# Patient Record
Sex: FEMALE | URBAN - METROPOLITAN AREA
[De-identification: names, ages, dates, MRNs, and addresses within clinical notes are randomized per-mention and may not be internally consistent; named-entity substitution may affect disease eponyms.]

---

## 2020-07-06 ENCOUNTER — OUT OF OFFICE VISIT (OUTPATIENT)
Dept: URBAN - METROPOLITAN AREA MEDICAL CENTER 1 | Facility: MEDICAL CENTER | Age: 40
End: 2020-07-06
Payer: SELF-PAY

## 2020-07-06 DIAGNOSIS — R10.13 ABDOMINAL DISCOMFORT, EPIGASTRIC: ICD-10-CM

## 2020-07-06 DIAGNOSIS — E87.6 HYPOKALEMIA: ICD-10-CM

## 2020-07-06 DIAGNOSIS — R11.2 ACUTE NAUSEA WITH NONBILIOUS VOMITING: ICD-10-CM

## 2020-07-06 PROCEDURE — 99254 IP/OBS CNSLTJ NEW/EST MOD 60: CPT | Performed by: INTERNAL MEDICINE

## 2020-07-07 ENCOUNTER — OUT OF OFFICE VISIT (OUTPATIENT)
Dept: URBAN - METROPOLITAN AREA MEDICAL CENTER 1 | Facility: MEDICAL CENTER | Age: 40
End: 2020-07-07
Payer: SELF-PAY

## 2020-07-07 DIAGNOSIS — R11.2 ACUTE NAUSEA WITH NONBILIOUS VOMITING: ICD-10-CM

## 2020-07-07 DIAGNOSIS — Z86.59 HISTORY OF BULIMIA: ICD-10-CM

## 2020-07-07 DIAGNOSIS — E87.8 ELECTROLYTE ABNORMALITY: ICD-10-CM

## 2020-07-07 PROCEDURE — 99232 SBSQ HOSP IP/OBS MODERATE 35: CPT | Performed by: INTERNAL MEDICINE

## 2020-07-08 ENCOUNTER — OUT OF OFFICE VISIT (OUTPATIENT)
Dept: URBAN - METROPOLITAN AREA MEDICAL CENTER 1 | Facility: MEDICAL CENTER | Age: 40
End: 2020-07-08
Payer: SELF-PAY

## 2020-07-08 DIAGNOSIS — K92.2 ACUTE GASTROINTESTINAL BLEEDING: ICD-10-CM

## 2020-07-08 DIAGNOSIS — K29.60 ADENOPAPILLOMATOSIS GASTRICA: ICD-10-CM

## 2020-07-08 DIAGNOSIS — R11.0 CHRONIC NAUSEA: ICD-10-CM

## 2020-07-08 DIAGNOSIS — K29.80 ACUTE DUODENITIS: ICD-10-CM

## 2020-07-08 PROCEDURE — 43239 EGD BIOPSY SINGLE/MULTIPLE: CPT | Performed by: INTERNAL MEDICINE

## 2020-08-18 ENCOUNTER — OFFICE VISIT (OUTPATIENT)
Dept: URBAN - METROPOLITAN AREA TELEHEALTH 2 | Facility: TELEHEALTH | Age: 40
End: 2020-08-18

## 2020-08-19 ENCOUNTER — OFFICE VISIT (OUTPATIENT)
Dept: URBAN - NONMETROPOLITAN AREA CLINIC 13 | Facility: CLINIC | Age: 40
End: 2020-08-19

## 2020-08-19 NOTE — HPI-TODAY'S VISIT:
39 year old with history of anorexia nervosa/bulimia, anxiety, chronic pain on chronic opioid medication, prolonged QTc, who was hospitalized at [facility] in 2020 for nausea/vomiting/diarrhea, who presents for follow-up .  Note: had laparoscopic cholecystectomy in 2020 for concern for acalculous cholecystitis.   Possible related to surreptitious use of laxatives. Symptoms resolved during her hospitalization prior to undergoing stool studies. Cortisol normal in the past.   2020: Fecal occult positive.   Underwnet EGD: gastric ulcers. Biopsied.   Seen 2020 for tachycardia.  Pathology from cholecystectomy revealed mild chronic cholecystitis. No evidence of carcinoma.   3/2020: Giardia/cryptosporidium negative, shiga-like toxin negative, 	 3/2020: Stool culture: No Salmonella, Shigella, Campylobacter, Vibrio, Aeromonas, Plesiomonas, Yersinia   Prior Endoscopic Evaluation: 2020: EGD FINDINGS / IMPRESSION: The entire esophagus appeared normal.  The antrum contained several shallow clean base erosions and superimposed bed of inflammation consistent with erosive gastritis.  Biopsies were obtained from the antrum and body with cold forceps and sent for pathology to evaluate for H. Pylori.  The duodenal bulb and second portion of the duodenum appeared normal.  Random biopsies were obtained from the second portion of the duodenum with cold forceps and sent for pathology.  Erosive gastritis may be contributing to clinical symptoms.    2020: Pathology from EGD A.   DUODENUM, BIOPSY:              MINIMAL NONSPECIFIC DUODENAL VILLOUS ARCHITECTURAL BLUNTING.              MILD CHRONIC NONSPECIFIC INFLAMMATION.                NO CARCINOMA SEEN. B.  STOMACH, BIOPSY:              MILD TO MODERATE CHRONIC GASTRITIS.              IMMUNOPEROXIDASE  STAIN IS NEGATIVE FOR HELICOBACTER.              NO CARCINOMA SEEN.    Prior Imagin2019: CT Abdomen and Pelvis with Contrast IMPRESSION: 1. No significant change comparison to prior study performed one day prior. Persistent thickening of multiple small bowel loops as well as a large amount stool in the rectosigmoid colon. No pneumatosis or free air. 2. Mild free fluid in the abdomen and pelvis. No drainable abscess.  2020: Abdominal Ultrasound IMPRESSION: 1. No shadowing echogenic gallstones are identified. However, the gallbladder demonstrates circumferential wall thickening/edema, there is trace pericholecystic fluid, and sonographer reports positive sonographic Abrams's sign. These findings are concerning for acalculous acute cholecystitis.   2. Mildly increased right renal cortical echogenicity, suggestive of medical renal disease.  Plan: -CBC, chemistry, TSH, Celiac serologies. consider EGD/colonoscop with random colon biosies. - Think about cyclic vomiting syndrome.  - Think about gastric emptying - Think about stopping opioid pain medication. - Stool studies  - Bowel regimen - CT-Etnerography given thcikened small bowel mucosa.  - Repeat CBC, chemistry, TSH (as was >21 in the past). Repeat C difficile as PCR positive but toxin negative in 2020.

## 2020-08-24 ENCOUNTER — OFFICE VISIT (OUTPATIENT)
Dept: URBAN - NONMETROPOLITAN AREA CLINIC 13 | Facility: CLINIC | Age: 40
End: 2020-08-24
Payer: SELF-PAY

## 2020-08-24 DIAGNOSIS — R93.3 ABNORMAL CT SCAN, COLON: ICD-10-CM

## 2020-08-24 DIAGNOSIS — R11.0 NAUSEA: ICD-10-CM

## 2020-08-24 DIAGNOSIS — K29.70 GASTRITIS, PRESENCE OF BLEEDING UNSPECIFIED, UNSPECIFIED CHRONICITY, UNSPECIFIED GASTRITIS TYPE: ICD-10-CM

## 2020-08-24 DIAGNOSIS — N17.9 ACUTE KIDNEY INJURY: ICD-10-CM

## 2020-08-24 PROCEDURE — 99204 OFFICE O/P NEW MOD 45 MIN: CPT | Performed by: INTERNAL MEDICINE

## 2020-08-24 PROCEDURE — G8420 CALC BMI NORM PARAMETERS: HCPCS | Performed by: INTERNAL MEDICINE

## 2020-08-24 PROCEDURE — G8427 DOCREV CUR MEDS BY ELIG CLIN: HCPCS | Performed by: INTERNAL MEDICINE

## 2020-08-24 RX ORDER — PROCHLORPERAZINE MALEATE 10 MG/1
1 TABLET AS NEEDED TABLET ORAL THREE TIMES A DAY
Qty: 270 TABLET | Refills: 3 | OUTPATIENT
Start: 2020-08-24

## 2020-08-24 RX ORDER — ALPRAZOLAM 1 MG/1
1 TABLET TABLET ORAL TWICE A DAY
Status: ACTIVE | COMMUNITY

## 2020-08-24 RX ORDER — ZOLPIDEM TARTRATE 5 MG/1
1 TABLET AT BEDTIME TABLET, FILM COATED ORAL ONCE A DAY
Status: ACTIVE | COMMUNITY

## 2020-08-24 RX ORDER — SUCRALFATE 1 G/1
1 TABLET ON AN EMPTY STOMACH TABLET ORAL TWICE A DAY
Status: ACTIVE | COMMUNITY

## 2020-08-24 RX ORDER — ACETAMINOPHEN 650 MG/20.3ML
AS DIRECTED SOLUTION ORAL
Status: ACTIVE | COMMUNITY

## 2020-08-24 RX ORDER — LEVOTHYROXINE SODIUM 100 UG/1
1 TABLET IN THE MORNING ON AN EMPTY STOMACH TABLET ORAL ONCE A DAY
Status: ACTIVE | COMMUNITY

## 2020-08-24 RX ORDER — MULTIVITAMIN/IRON/FOLIC ACID 18MG-0.4MG
1 TABLET AT BEDTIME AS NEEDED TABLET ORAL ONCE A DAY
Status: ACTIVE | COMMUNITY

## 2020-08-24 RX ORDER — TIZANIDINE 4 MG/1
1 TABLET AS NEEDED TABLET ORAL THREE TIMES A DAY
Status: ACTIVE | COMMUNITY

## 2020-08-24 RX ORDER — OMEGA-3/DHA/EPA/FISH OIL 300-1000MG
AS DIRECTED CAPSULE ORAL
Status: ACTIVE | COMMUNITY

## 2020-08-24 RX ORDER — OXYCODONE AND ACETAMINOPHEN 5; 325 MG/1; MG/1
1 TABLET AS NEEDED TABLET ORAL
Status: ACTIVE | COMMUNITY

## 2020-08-24 RX ORDER — PANTOPRAZOLE SODIUM 40 MG/1
1 TABLET TABLET, DELAYED RELEASE ORAL ONCE A DAY
Status: ACTIVE | COMMUNITY

## 2020-08-24 RX ORDER — FOLIC ACID/MULTIVIT,IRON,MINER .4-18-35
AS DIRECTED TABLET,CHEWABLE ORAL
Status: ACTIVE | COMMUNITY

## 2020-08-24 RX ORDER — POTASSIUM CHLORIDE 1.5 G/1.58G
1 PACKET WITH FOOD POWDER, FOR SOLUTION ORAL ONCE A DAY
Status: ACTIVE | COMMUNITY

## 2020-08-24 RX ORDER — DULOXETINE 60 MG/1
1 CAPSULE CAPSULE, DELAYED RELEASE PELLETS ORAL ONCE A DAY
Status: ACTIVE | COMMUNITY

## 2020-08-24 NOTE — HPI-TODAY'S VISIT:
Ms. Goetz is a pleasant 40 y/o F referred to GI clinic for evaluation and management of chronic nausea.  This has been severe for months and is worsening.  She was hospitalized for volume depletion recently and just got discharged from [facility].  Workup to date includes EGD with erosions, H. pylori negative.  She is s/p CCY as she had GB wall thickening.  She was found to have ? colitis on a recent CT scan, however this was noncontrasted.  CMP including albumin normal.  CBC showed normocytic anemia.  She is still nauseous.  She has been taking compazine but is out now.  She was taking Zofran but they stopped it as it as she was having long QT on it.  Her hospitalizations, imaging, and procedures were evaluated.

## 2020-08-27 ENCOUNTER — TELEPHONE ENCOUNTER (OUTPATIENT)
Dept: URBAN - METROPOLITAN AREA CLINIC 92 | Facility: CLINIC | Age: 40
End: 2020-08-27

## 2020-09-03 ENCOUNTER — OUT OF OFFICE VISIT (OUTPATIENT)
Dept: URBAN - METROPOLITAN AREA MEDICAL CENTER 1 | Facility: MEDICAL CENTER | Age: 40
End: 2020-09-03
Payer: SELF-PAY

## 2020-09-03 DIAGNOSIS — R11.2 ACUTE NAUSEA WITH NONBILIOUS VOMITING: ICD-10-CM

## 2020-09-03 DIAGNOSIS — E87.8 ELECTROLYTE ABNORMALITY: ICD-10-CM

## 2020-09-03 PROCEDURE — 99254 IP/OBS CNSLTJ NEW/EST MOD 60: CPT | Performed by: INTERNAL MEDICINE

## 2020-10-06 ENCOUNTER — OUT OF OFFICE VISIT (OUTPATIENT)
Dept: URBAN - METROPOLITAN AREA MEDICAL CENTER 1 | Facility: MEDICAL CENTER | Age: 40
End: 2020-10-06
Payer: SELF-PAY

## 2020-10-06 DIAGNOSIS — R93.3 ABN FINDINGS-GI TRACT: ICD-10-CM

## 2020-10-06 DIAGNOSIS — E87.6 HYPOKALEMIA: ICD-10-CM

## 2020-10-06 DIAGNOSIS — F50.00 ANOREXIA NERVOSA: ICD-10-CM

## 2020-10-06 DIAGNOSIS — R11.2 ACUTE NAUSEA WITH NONBILIOUS VOMITING: ICD-10-CM

## 2020-10-06 PROCEDURE — 99232 SBSQ HOSP IP/OBS MODERATE 35: CPT | Performed by: INTERNAL MEDICINE

## 2020-10-06 PROCEDURE — 99254 IP/OBS CNSLTJ NEW/EST MOD 60: CPT | Performed by: INTERNAL MEDICINE

## 2020-10-21 ENCOUNTER — OFFICE VISIT (OUTPATIENT)
Dept: URBAN - METROPOLITAN AREA MEDICAL CENTER 1 | Facility: MEDICAL CENTER | Age: 40
End: 2020-10-21

## 2021-11-02 ENCOUNTER — TELEPHONE ENCOUNTER (OUTPATIENT)
Dept: URBAN - NONMETROPOLITAN AREA CLINIC 2 | Facility: CLINIC | Age: 41
End: 2021-11-02

## 2021-11-02 RX ORDER — PROCHLORPERAZINE MALEATE 10 MG/1
1 TABLET AS NEEDED TABLET ORAL THREE TIMES A DAY
Qty: 90 | Refills: 0
Start: 2020-08-24

## 2021-11-08 ENCOUNTER — OFFICE VISIT (OUTPATIENT)
Dept: URBAN - NONMETROPOLITAN AREA CLINIC 13 | Facility: CLINIC | Age: 41
End: 2021-11-08
Payer: SELF-PAY

## 2021-11-08 VITALS
DIASTOLIC BLOOD PRESSURE: 64 MMHG | HEIGHT: 64 IN | WEIGHT: 91.6 LBS | BODY MASS INDEX: 15.64 KG/M2 | HEART RATE: 142 BPM | SYSTOLIC BLOOD PRESSURE: 86 MMHG

## 2021-11-08 DIAGNOSIS — R11.0 NAUSEA: ICD-10-CM

## 2021-11-08 DIAGNOSIS — R63.4 WEIGHT LOSS: ICD-10-CM

## 2021-11-08 DIAGNOSIS — A04.72 C. DIFFICILE COLITIS: ICD-10-CM

## 2021-11-08 PROCEDURE — 99214 OFFICE O/P EST MOD 30 MIN: CPT | Performed by: NURSE PRACTITIONER

## 2021-11-08 RX ORDER — PANTOPRAZOLE SODIUM 40 MG/1
1 TABLET TABLET, DELAYED RELEASE ORAL ONCE A DAY
Status: ACTIVE | COMMUNITY

## 2021-11-08 RX ORDER — DULOXETINE 60 MG/1
1 CAPSULE CAPSULE, DELAYED RELEASE PELLETS ORAL ONCE A DAY
Status: ACTIVE | COMMUNITY

## 2021-11-08 RX ORDER — ALPRAZOLAM 1 MG/1
1 TABLET TABLET ORAL TWICE A DAY
Status: ACTIVE | COMMUNITY

## 2021-11-08 RX ORDER — FOLIC ACID/MULTIVIT,IRON,MINER .4-18-35
AS DIRECTED TABLET,CHEWABLE ORAL
Status: ACTIVE | COMMUNITY

## 2021-11-08 RX ORDER — PROCHLORPERAZINE MALEATE 10 MG/1
1 TABLET AS NEEDED TABLET ORAL THREE TIMES A DAY
Qty: 90 | Refills: 0 | Status: ACTIVE | COMMUNITY
Start: 2020-08-24

## 2021-11-08 RX ORDER — ZOLPIDEM TARTRATE 5 MG/1
1 TABLET AT BEDTIME TABLET, FILM COATED ORAL ONCE A DAY
Status: ACTIVE | COMMUNITY

## 2021-11-08 RX ORDER — PANTOPRAZOLE SODIUM 40 MG/1
1 TABLET TABLET, DELAYED RELEASE ORAL TWICE DAILY
Qty: 60 TABLET | Refills: 6 | OUTPATIENT
Start: 2021-11-08

## 2021-11-08 RX ORDER — PROMETHAZINE HYDROCHLORIDE 12.5 MG/1
1 TABLET AS NEEDED TABLET ORAL
Qty: 30 TABLET | OUTPATIENT
Start: 2021-11-08 | End: 2021-12-08

## 2021-11-08 RX ORDER — ACETAMINOPHEN 650 MG/20.3ML
AS DIRECTED SOLUTION ORAL
Status: ACTIVE | COMMUNITY

## 2021-11-08 RX ORDER — TIZANIDINE 4 MG/1
1 TABLET AS NEEDED TABLET ORAL THREE TIMES A DAY
Status: ACTIVE | COMMUNITY

## 2021-11-08 RX ORDER — POTASSIUM CHLORIDE 1.5 G/1.58G
1 PACKET WITH FOOD POWDER, FOR SOLUTION ORAL ONCE A DAY
Status: ACTIVE | COMMUNITY

## 2021-11-08 RX ORDER — DICYCLOMINE HYDROCHLORIDE 10 MG/1
1 CAPSULES CAPSULE ORAL THREE TIMES A DAY
Qty: 90 CAPSULE | Refills: 3 | OUTPATIENT
Start: 2021-11-08 | End: 2022-03-08

## 2021-11-08 RX ORDER — OMEGA-3/DHA/EPA/FISH OIL 300-1000MG
AS DIRECTED CAPSULE ORAL
Status: ACTIVE | COMMUNITY

## 2021-11-08 RX ORDER — SUCRALFATE 1 G/1
1 TABLET ON AN EMPTY STOMACH TABLET ORAL TWICE A DAY
Status: ACTIVE | COMMUNITY

## 2021-11-08 RX ORDER — MULTIVITAMIN/IRON/FOLIC ACID 18MG-0.4MG
1 TABLET AT BEDTIME AS NEEDED TABLET ORAL ONCE A DAY
Status: ACTIVE | COMMUNITY

## 2021-11-08 RX ORDER — OXYCODONE AND ACETAMINOPHEN 5; 325 MG/1; MG/1
1 TABLET AS NEEDED TABLET ORAL
Status: ACTIVE | COMMUNITY

## 2021-11-08 RX ORDER — LEVOTHYROXINE SODIUM 100 UG/1
1 TABLET IN THE MORNING ON AN EMPTY STOMACH TABLET ORAL ONCE A DAY
Status: ACTIVE | COMMUNITY

## 2021-11-08 NOTE — HPI-TODAY'S VISIT:
Consult: Ms. Goetz is a pleasant 40 y/o F referred to GI clinic for evaluation and management of chronic nausea.  This has been severe for months and is worsening.  She was hospitalized for volume depletion recently and just got discharged from [facility].  Workup to date includes EGD with erosions, H. pylori negative.  She is s/p CCY as she had GB wall thickening.  She was found to have ? colitis on a recent CT scan, however this was noncontrasted.  CMP including albumin normal.  CBC showed normocytic anemia.  She is still nauseous.  She has been taking compazine but is out now.  She was taking Zofran but they stopped it as it as she was having long QT on it.  Her hospitalizations, imaging, and procedures were evaluated.  11/8/2021 Ira is a 40-year-old female who returns for hospital follow-up of C. difficile, colitis, weight loss, as well as nausea and vomiting.  She was seen last year with nausea and vomiting.  She had an EGD with some erosions.  Ended up with a cholecystectomy.  She is recommended a colonoscopy for colitis that was shown then.  Patient is uninsured and did not follow-up.  Over the last year, she was still getting spells of nausea and vomiting, but it seems to be related to her cycles.  She did see a GYN with no improvement.  She had numerous hospitalizations for electrolyte imbalances.  Here recently, in October, she developed diarrhea nausea and vomiting.  She was diagnosed with C. difficile with pancolitis on imaging.  Since then, she has not been doing well.  She is also been readmitted once with hypotension requiring ICU stay.  She states she has lost about 25 to 30 pounds.  She states Compazine is typically the only thing that helps her nausea. Sb

## 2021-11-15 ENCOUNTER — OUT OF OFFICE VISIT (OUTPATIENT)
Dept: URBAN - METROPOLITAN AREA MEDICAL CENTER 1 | Facility: MEDICAL CENTER | Age: 41
End: 2021-11-15
Payer: SELF-PAY

## 2021-11-15 DIAGNOSIS — A41.89 OTHER SPECIFIED SEPSIS: ICD-10-CM

## 2021-11-15 DIAGNOSIS — D50.9 ANEMIA: ICD-10-CM

## 2021-11-15 DIAGNOSIS — R11.2 ACUTE NAUSEA WITH NONBILIOUS VOMITING: ICD-10-CM

## 2021-11-15 DIAGNOSIS — R19.7 ACUTE DIARRHEA: ICD-10-CM

## 2021-11-15 PROCEDURE — 99254 IP/OBS CNSLTJ NEW/EST MOD 60: CPT | Performed by: INTERNAL MEDICINE

## 2021-11-16 ENCOUNTER — OUT OF OFFICE VISIT (OUTPATIENT)
Dept: URBAN - METROPOLITAN AREA MEDICAL CENTER 1 | Facility: MEDICAL CENTER | Age: 41
End: 2021-11-16
Payer: SELF-PAY

## 2021-11-16 DIAGNOSIS — K29.60 ADENOPAPILLOMATOSIS GASTRICA: ICD-10-CM

## 2021-11-16 DIAGNOSIS — B37.81 CANDIDA: ICD-10-CM

## 2021-11-16 DIAGNOSIS — K29.80 ACUTE DUODENITIS: ICD-10-CM

## 2021-11-16 PROCEDURE — 43239 EGD BIOPSY SINGLE/MULTIPLE: CPT | Performed by: INTERNAL MEDICINE

## 2021-11-17 ENCOUNTER — OUT OF OFFICE VISIT (OUTPATIENT)
Dept: URBAN - METROPOLITAN AREA MEDICAL CENTER 1 | Facility: MEDICAL CENTER | Age: 41
End: 2021-11-17
Payer: SELF-PAY

## 2021-11-17 DIAGNOSIS — D50.9 ANEMIA: ICD-10-CM

## 2021-11-17 DIAGNOSIS — A41.89 OTHER SPECIFIED SEPSIS: ICD-10-CM

## 2021-11-17 DIAGNOSIS — R19.7 ACUTE DIARRHEA: ICD-10-CM

## 2021-11-17 DIAGNOSIS — R11.2 ACUTE NAUSEA WITH NONBILIOUS VOMITING: ICD-10-CM

## 2021-11-17 PROCEDURE — 99232 SBSQ HOSP IP/OBS MODERATE 35: CPT | Performed by: INTERNAL MEDICINE

## 2021-11-19 NOTE — PHYSICAL EXAM HENT:
Head,  normocephalic,  atraumatic,  Face,  Face within normal limits,  Ears,  External ears within normal limits,  Nose/Nasopharynx,  External nose  normal appearance,  nares patent,  no nasal discharge,  Mouth and Throat,  Oral cavity appearance normal,  Breath odor normal,  Lips,  Appearance normal
Pt presents to ED with c/o pain and swelling in bilateral legs for 2x weeks.

## 2021-11-24 ENCOUNTER — OUT OF OFFICE VISIT (OUTPATIENT)
Dept: URBAN - METROPOLITAN AREA MEDICAL CENTER 1 | Facility: MEDICAL CENTER | Age: 41
End: 2021-11-24
Payer: SELF-PAY

## 2021-11-24 DIAGNOSIS — B37.81 CANDIDA: ICD-10-CM

## 2021-11-24 DIAGNOSIS — R19.7 ACUTE DIARRHEA: ICD-10-CM

## 2021-11-24 DIAGNOSIS — N17.8 OTHER ACUTE KIDNEY FAILURE: ICD-10-CM

## 2021-11-24 DIAGNOSIS — R13.19 CERVICAL DYSPHAGIA: ICD-10-CM

## 2021-11-24 PROCEDURE — 99254 IP/OBS CNSLTJ NEW/EST MOD 60: CPT | Performed by: INTERNAL MEDICINE

## 2021-12-21 ENCOUNTER — TELEPHONE ENCOUNTER (OUTPATIENT)
Dept: URBAN - NONMETROPOLITAN AREA CLINIC 13 | Facility: CLINIC | Age: 41
End: 2021-12-21

## 2021-12-21 RX ORDER — PROCHLORPERAZINE MALEATE 10 MG/1
1 TABLET AS NEEDED TABLET ORAL THREE TIMES A DAY
Qty: 90 | Refills: 0
Start: 2020-08-24

## 2021-12-28 ENCOUNTER — ERX REFILL RESPONSE (OUTPATIENT)
Dept: URBAN - NONMETROPOLITAN AREA CLINIC 2 | Facility: CLINIC | Age: 41
End: 2021-12-28

## 2021-12-28 RX ORDER — PROCHLORPERAZINE MALEATE 10 MG/1
TAKE ONE TABLET BY MOUTH THREE TIMES A DAY AS NEEDED TABLET ORAL
Qty: 90 TABLET | Refills: 1 | OUTPATIENT

## 2022-01-12 ENCOUNTER — OFFICE VISIT (OUTPATIENT)
Dept: URBAN - METROPOLITAN AREA MEDICAL CENTER 1 | Facility: MEDICAL CENTER | Age: 42
End: 2022-01-12
Payer: SELF-PAY

## 2022-01-12 PROCEDURE — 992 NON-BILLABLE: Performed by: INTERNAL MEDICINE

## 2022-02-07 ENCOUNTER — OFFICE VISIT (OUTPATIENT)
Dept: URBAN - NONMETROPOLITAN AREA CLINIC 13 | Facility: CLINIC | Age: 42
End: 2022-02-07

## 2022-03-02 ENCOUNTER — TELEPHONE ENCOUNTER (OUTPATIENT)
Dept: URBAN - NONMETROPOLITAN AREA CLINIC 13 | Facility: CLINIC | Age: 42
End: 2022-03-02

## 2022-03-02 RX ORDER — MUPIROCIN 20 MG/G
1 APPLICATION OINTMENT TOPICAL TWICE A DAY
Qty: 1 TUBE | Refills: 2 | OUTPATIENT
Start: 2022-03-02 | End: 2022-04-13

## 2022-03-11 ENCOUNTER — OUT OF OFFICE VISIT (OUTPATIENT)
Dept: URBAN - METROPOLITAN AREA MEDICAL CENTER 1 | Facility: MEDICAL CENTER | Age: 42
End: 2022-03-11
Payer: SELF-PAY

## 2022-03-11 DIAGNOSIS — K94.29 GASTRIC TUBE GRANULATION TISSUE: ICD-10-CM

## 2022-03-11 DIAGNOSIS — R11.2 ACUTE NAUSEA WITH NONBILIOUS VOMITING: ICD-10-CM

## 2022-03-11 PROCEDURE — 99253 IP/OBS CNSLTJ NEW/EST LOW 45: CPT | Performed by: INTERNAL MEDICINE

## 2022-03-12 ENCOUNTER — OUT OF OFFICE VISIT (OUTPATIENT)
Dept: URBAN - METROPOLITAN AREA MEDICAL CENTER 1 | Facility: MEDICAL CENTER | Age: 42
End: 2022-03-12
Payer: SELF-PAY

## 2022-03-12 DIAGNOSIS — K94.29 GASTRIC TUBE GRANULATION TISSUE: ICD-10-CM

## 2022-03-12 PROCEDURE — 99232 SBSQ HOSP IP/OBS MODERATE 35: CPT | Performed by: INTERNAL MEDICINE

## 2022-03-13 ENCOUNTER — OUT OF OFFICE VISIT (OUTPATIENT)
Dept: URBAN - METROPOLITAN AREA MEDICAL CENTER 1 | Facility: MEDICAL CENTER | Age: 42
End: 2022-03-13
Payer: SELF-PAY

## 2022-03-13 DIAGNOSIS — Z86.59 HISTORY OF ANOREXIA NERVOSA: ICD-10-CM

## 2022-03-13 DIAGNOSIS — K59.09 CHANGE IN BOWEL MOVEMENTS INTERMITTENT CONSTIPATION. URGENCY IN THE MORNING.: ICD-10-CM

## 2022-03-13 DIAGNOSIS — R11.2 ACUTE NAUSEA WITH NONBILIOUS VOMITING: ICD-10-CM

## 2022-03-13 DIAGNOSIS — K94.29 GASTRIC TUBE GRANULATION TISSUE: ICD-10-CM

## 2022-03-13 PROCEDURE — 99232 SBSQ HOSP IP/OBS MODERATE 35: CPT | Performed by: INTERNAL MEDICINE

## 2022-03-15 ENCOUNTER — DASHBOARD ENCOUNTERS (OUTPATIENT)
Age: 42
End: 2022-03-15

## 2022-03-23 ENCOUNTER — OFFICE VISIT (OUTPATIENT)
Dept: URBAN - NONMETROPOLITAN AREA CLINIC 2 | Facility: CLINIC | Age: 42
End: 2022-03-23

## 2022-03-23 RX ORDER — FOLIC ACID/MULTIVIT,IRON,MINER .4-18-35
AS DIRECTED TABLET,CHEWABLE ORAL
Status: ACTIVE | COMMUNITY

## 2022-03-23 RX ORDER — LEVOTHYROXINE SODIUM 100 UG/1
1 TABLET IN THE MORNING ON AN EMPTY STOMACH TABLET ORAL ONCE A DAY
Status: ACTIVE | COMMUNITY

## 2022-03-23 RX ORDER — PANTOPRAZOLE SODIUM 40 MG/1
1 TABLET TABLET, DELAYED RELEASE ORAL ONCE A DAY
Status: ACTIVE | COMMUNITY

## 2022-03-23 RX ORDER — MUPIROCIN 20 MG/G
1 APPLICATION OINTMENT TOPICAL TWICE A DAY
Qty: 1 TUBE | Refills: 2 | Status: ACTIVE | COMMUNITY
Start: 2022-03-02 | End: 2022-04-13

## 2022-03-23 RX ORDER — PANTOPRAZOLE SODIUM 40 MG/1
1 TABLET TABLET, DELAYED RELEASE ORAL TWICE DAILY
Qty: 60 TABLET | Refills: 6 | Status: ACTIVE | COMMUNITY
Start: 2021-11-08

## 2022-03-23 RX ORDER — ZOLPIDEM TARTRATE 5 MG/1
1 TABLET AT BEDTIME TABLET, FILM COATED ORAL ONCE A DAY
Status: ACTIVE | COMMUNITY

## 2022-03-23 RX ORDER — ACETAMINOPHEN 650 MG/20.3ML
AS DIRECTED SOLUTION ORAL
Status: ACTIVE | COMMUNITY

## 2022-03-23 RX ORDER — OXYCODONE AND ACETAMINOPHEN 5; 325 MG/1; MG/1
1 TABLET AS NEEDED TABLET ORAL
Status: ACTIVE | COMMUNITY

## 2022-03-23 RX ORDER — POTASSIUM CHLORIDE 1.5 G/1.58G
1 PACKET WITH FOOD POWDER, FOR SOLUTION ORAL ONCE A DAY
Status: ACTIVE | COMMUNITY

## 2022-03-23 RX ORDER — TIZANIDINE 4 MG/1
1 TABLET AS NEEDED TABLET ORAL THREE TIMES A DAY
Status: ACTIVE | COMMUNITY

## 2022-03-23 RX ORDER — OMEGA-3/DHA/EPA/FISH OIL 300-1000MG
AS DIRECTED CAPSULE ORAL
Status: ACTIVE | COMMUNITY

## 2022-03-23 RX ORDER — MULTIVITAMIN/IRON/FOLIC ACID 18MG-0.4MG
1 TABLET AT BEDTIME AS NEEDED TABLET ORAL ONCE A DAY
Status: ACTIVE | COMMUNITY

## 2022-03-23 RX ORDER — ALPRAZOLAM 1 MG/1
1 TABLET TABLET ORAL TWICE A DAY
Status: ACTIVE | COMMUNITY

## 2022-03-23 RX ORDER — PROCHLORPERAZINE MALEATE 10 MG/1
TAKE ONE TABLET BY MOUTH THREE TIMES A DAY AS NEEDED TABLET ORAL
Qty: 90 TABLET | Refills: 1 | Status: ACTIVE | COMMUNITY

## 2022-03-23 RX ORDER — DULOXETINE 60 MG/1
1 CAPSULE CAPSULE, DELAYED RELEASE PELLETS ORAL ONCE A DAY
Status: ACTIVE | COMMUNITY

## 2022-03-23 RX ORDER — SUCRALFATE 1 G/1
1 TABLET ON AN EMPTY STOMACH TABLET ORAL TWICE A DAY
Status: ACTIVE | COMMUNITY